# Patient Record
Sex: FEMALE | Race: WHITE | NOT HISPANIC OR LATINO | Employment: FULL TIME | URBAN - METROPOLITAN AREA
[De-identification: names, ages, dates, MRNs, and addresses within clinical notes are randomized per-mention and may not be internally consistent; named-entity substitution may affect disease eponyms.]

---

## 2021-05-30 ENCOUNTER — HOSPITAL ENCOUNTER (EMERGENCY)
Facility: CLINIC | Age: 51
Discharge: HOME | End: 2021-05-30
Attending: EMERGENCY MEDICINE | Admitting: EMERGENCY MEDICINE
Payer: COMMERCIAL

## 2021-05-30 VITALS
HEART RATE: 70 BPM | TEMPERATURE: 98.6 F | DIASTOLIC BLOOD PRESSURE: 75 MMHG | RESPIRATION RATE: 18 BRPM | SYSTOLIC BLOOD PRESSURE: 122 MMHG | OXYGEN SATURATION: 96 %

## 2021-05-30 DIAGNOSIS — G43.819 OTHER MIGRAINE WITHOUT STATUS MIGRAINOSUS, INTRACTABLE: ICD-10-CM

## 2021-05-30 PROCEDURE — 258N000003 HC RX IP 258 OP 636: Performed by: EMERGENCY MEDICINE

## 2021-05-30 PROCEDURE — 96375 TX/PRO/DX INJ NEW DRUG ADDON: CPT

## 2021-05-30 PROCEDURE — 96372 THER/PROPH/DIAG INJ SC/IM: CPT | Mod: 59 | Performed by: EMERGENCY MEDICINE

## 2021-05-30 PROCEDURE — 96365 THER/PROPH/DIAG IV INF INIT: CPT

## 2021-05-30 PROCEDURE — 99285 EMERGENCY DEPT VISIT HI MDM: CPT | Mod: 25

## 2021-05-30 PROCEDURE — 96361 HYDRATE IV INFUSION ADD-ON: CPT

## 2021-05-30 PROCEDURE — 250N000011 HC RX IP 250 OP 636: Performed by: EMERGENCY MEDICINE

## 2021-05-30 RX ORDER — KETOROLAC TROMETHAMINE 15 MG/ML
15 INJECTION, SOLUTION INTRAMUSCULAR; INTRAVENOUS ONCE
Status: COMPLETED | OUTPATIENT
Start: 2021-05-30 | End: 2021-05-30

## 2021-05-30 RX ORDER — OLANZAPINE 10 MG/2ML
5 INJECTION, POWDER, FOR SOLUTION INTRAMUSCULAR ONCE
Status: COMPLETED | OUTPATIENT
Start: 2021-05-30 | End: 2021-05-30

## 2021-05-30 RX ORDER — SODIUM CHLORIDE 9 MG/ML
INJECTION, SOLUTION INTRAVENOUS
Status: DISCONTINUED
Start: 2021-05-30 | End: 2021-05-30 | Stop reason: HOSPADM

## 2021-05-30 RX ORDER — DEXAMETHASONE SODIUM PHOSPHATE 10 MG/ML
10 INJECTION, SOLUTION INTRAMUSCULAR; INTRAVENOUS ONCE
Status: COMPLETED | OUTPATIENT
Start: 2021-05-30 | End: 2021-05-30

## 2021-05-30 RX ORDER — METOCLOPRAMIDE HYDROCHLORIDE 5 MG/ML
10 INJECTION INTRAMUSCULAR; INTRAVENOUS ONCE
Status: COMPLETED | OUTPATIENT
Start: 2021-05-30 | End: 2021-05-30

## 2021-05-30 RX ORDER — MAGNESIUM SULFATE HEPTAHYDRATE 40 MG/ML
2 INJECTION, SOLUTION INTRAVENOUS ONCE
Status: COMPLETED | OUTPATIENT
Start: 2021-05-30 | End: 2021-05-30

## 2021-05-30 RX ORDER — DIPHENHYDRAMINE HYDROCHLORIDE 50 MG/ML
25 INJECTION INTRAMUSCULAR; INTRAVENOUS ONCE
Status: COMPLETED | OUTPATIENT
Start: 2021-05-30 | End: 2021-05-30

## 2021-05-30 RX ADMIN — KETOROLAC TROMETHAMINE 15 MG: 15 INJECTION, SOLUTION INTRAMUSCULAR; INTRAVENOUS at 09:30

## 2021-05-30 RX ADMIN — OLANZAPINE 5 MG: 10 INJECTION, POWDER, LYOPHILIZED, FOR SOLUTION INTRAMUSCULAR at 10:26

## 2021-05-30 RX ADMIN — DEXAMETHASONE SODIUM PHOSPHATE 10 MG: 10 INJECTION, SOLUTION INTRAMUSCULAR; INTRAVENOUS at 10:30

## 2021-05-30 RX ADMIN — MAGNESIUM SULFATE HEPTAHYDRATE 2 G: 2 INJECTION, SOLUTION INTRAVENOUS at 10:50

## 2021-05-30 RX ADMIN — METOCLOPRAMIDE HYDROCHLORIDE 10 MG: 5 INJECTION INTRAMUSCULAR; INTRAVENOUS at 09:31

## 2021-05-30 RX ADMIN — DIPHENHYDRAMINE HYDROCHLORIDE 25 MG: 50 INJECTION INTRAMUSCULAR; INTRAVENOUS at 09:28

## 2021-05-30 RX ADMIN — SODIUM CHLORIDE 1000 ML: 9 INJECTION, SOLUTION INTRAVENOUS at 09:26

## 2021-05-30 ASSESSMENT — ENCOUNTER SYMPTOMS
VOMITING: 1
WEAKNESS: 0
ABDOMINAL PAIN: 0
NUMBNESS: 0
COUGH: 0
DIZZINESS: 0
LIGHT-HEADEDNESS: 0
SPEECH DIFFICULTY: 0
FEVER: 0
SHORTNESS OF BREATH: 0
NAUSEA: 1
CHILLS: 0
HEADACHES: 1

## 2021-05-30 NOTE — DISCHARGE INSTRUCTIONS
-Take acetaminophen 500 to 1000 mg by mouth every 4 to 6 hours as needed for pain or fever.  Do not take more than 4000 mg in 24 hours.  Do not take within 6 hours of another acetaminophen containing medication such as norco (vicodin) or percocet.  - Take ibuprofen 600 to 800 mg by mouth every 6 to 8 hours as needed for pain or fever    Please return to the emergency department as needed for new or worsening symptoms including severe and uncontrollable pain, severe confusion, seizures, fainting, vomiting unable to keep anything down, any other concerning symptoms.          Discharge Instructions  Migraine    You were seen today for a headache that your provider thinks is likely a migraine. At this time your provider does not find that your headache is a sign of anything dangerous or life-threatening.  However, sometimes the signs of serious illness do not show up right away.      Generally, every Emergency Department visit should have a follow-up clinic visit with either a primary or a specialty clinic/provider. Please follow-up as instructed by your emergency provider today.    Return to the Emergency Department if:  You get a fever of 100.4 F or higher.  You get a stiff neck with your headache.  You get a new headache that is different or worse than headaches you have had before.  You are vomiting (throwing up) and cannot keep food or water down.  You have blurry or double vision or other problems with your eyes.  You have a new weakness on one side of your body.  You have difficulty with balance which is new.  You or your family thinks you are confused.  You have a seizure.    Treatment:  Often, treatment for your migraine will take some time to make you headache stop.  Going home to sleep can be very effective.  Use your medications as directed; overuse of medications can actually cause headaches.  Once your headache has gone away, avoid triggers such as certain foods, skipping meals, bright lights, changes in  sleep, exercise and stress.  Migraine headaches can have symptoms before the pain starts, like vision changes, funny smells/tastes, dizziness or other symptoms.  Treating a headache as soon as the first symptoms come on is very important and gives the best chance of stopping the headache.  If headaches are severe or frequent, you may need to start daily medication to prevent the headaches.  Carbon monoxide can cause headaches, so not burning things in your home is important.  Also get a carbon monoxide detector.  Some medications for migraines may raise your blood pressure, so use with caution if you have high blood pressure or heart problems.  If you were given a prescription for medicine here today, be sure to read all of the information (including the package insert) that comes with your prescription.  This will include important information about the medicine, its side effects, and any warnings that you need to know about.  The pharmacist who fills the prescription can provide more information and answer questions you may have about the medicine.  If you have questions or concerns that the pharmacist cannot address, please call or return to the Emergency Department.   Remember that you can always come back to the Emergency Department if you are not able to see your regular provider in the amount of time listed above, if you get any new symptoms, or if there is anything that worries you.

## 2021-05-30 NOTE — ED TRIAGE NOTES
Pt to ER with c/o migraine typical for her n/v  
Airway patent, Nasal mucosa clear. Mouth with DRY mucosa. Throat has no vesicles, no oropharyngeal exudates and uvula is midline.

## 2021-05-30 NOTE — ED PROVIDER NOTES
History   Chief Complaint:  Headache     The history is provided by the patient.      Camilla Napier is a 51 year old female with history of migraines who presents with headcahe. The patient reports that she had onset of a migraine around 0100 this morning and had tried taking her Imitrex and Zofran but continued to have bouts of nausea and emesis every 15 minutes. She reports that this feels like her prior migraines and that she had a throbbing behind her eyes. She rates her pain at a 7-8/10. She denies hematemesis, one sided weakness, fever, cough, shortness of breath, abdominal pain, or recent falls or trauma to the head. She denies alcohol, tobacco use or street drugs.     Review of Systems   Constitutional: Negative for chills and fever.   Respiratory: Negative for cough and shortness of breath.    Cardiovascular: Negative for chest pain.   Gastrointestinal: Positive for nausea and vomiting. Negative for abdominal pain.   Neurological: Positive for headaches. Negative for dizziness, speech difficulty, weakness, light-headedness and numbness.   All other systems reviewed and are negative.      Allergies:  Codeine  Compazine [Prochlorperazine]  Haloperidol  Metoclopramide     Medications:  Imitrex    Zofran     Past Medical History:    Osteopenia   Migraines     Past Surgical History:    Arthroscopy knee left, x3   Breast biopsy     Family History:    Breast cancer  Asthma   Scoliosis   Heart disease   Colon polyps     Social History:  Smoking status: no  Alcohol use: no  Drug use: no  Presents to the ED with     Physical Exam     Patient Vitals for the past 24 hrs:   BP Temp Temp src Pulse Resp SpO2   05/30/21 1040 125/73 -- -- 59 -- 94 %   05/30/21 1030 -- -- -- -- -- 100 %   05/30/21 1020 (!) 133/90 -- -- 74 -- 95 %   05/30/21 1010 -- -- -- -- -- 94 %   05/30/21 1000 (!) 119/93 -- -- 63 -- 95 %   05/30/21 0652 136/78 98.6  F (37  C) Temporal 66 18 99 %       Physical Exam  Constitutional: Well  developed, nontox appearance  Head: Atraumatic.   Neck:  no stridor  Eyes: no scleral icterus, PERRL, EOMI  Cardiovascular: RRR, 2+ bilat radial pulses  Pulmonary/Chest: nml resp effort  Ext: Warm, well perfused, no edema  Neurological: A&O,  CNII-XII intact, 5/5 strength throughout upper and lower ext, symmetric; sensation grossly intact  Skin: Skin is warm and dry.   Psychiatric: Behavior is normal. Thought content normal.   Nursing note and vitals reviewed.    Emergency Department Course     Emergency Department Course:  Reviewed:  I reviewed the patient's nursing notes, vitals, past medical records, Care Everywhere.     Assessments:  0849 I performed an assessment and examination of the patient as noted above.      1143 The patient reports that she is feeling better. Findings and plan explained to the Patient. Patient discharged home with instructions regarding supportive care, medications, and reasons to return. The importance of close follow-up was reviewed.     Interventions:  0926 NS 1L IV Bolus   0928 Benadryl 25 mg, IV  0930 Toradol, 15 mg, IV   0931 Metoclopramide 10 mg, IV   1026 Zyprexa, 5 mg, IM   1030 Dexamethasone 10 mg, IV   1050 Magnesium sulfate, 2 g, IV    Disposition:  The patient was discharged to home.       Impression & Plan   Medical Decision Making:  Camilla Napier is a 51 year old female presenting w/ headache     Pt's presentation is consistent with their typical migraine/headache.  Doubt intracranial hemorrhage, intracranial mass, CNS infection such as meningitis or encephalitis,  acute vascular catastrophe.  Given the significantly low likelihood that the patient's headache is coming from a dangerous etiology, imaging deferred.  Medications given as noted above with improvement in symptoms.  Pt requested discharge prior to full resolution of symptoms.  At this time I feel the patient is safe for discharge.  Recommendations given regarding follow up with PCP, Neurology in home town  and return to the emergency department as needed for new or worsening symptoms.  Pt counseled on diagnosis and disposition.  They are understanding and agreeable to plan. Patient discharged in stable condition.       Covid-19  Camilla Napier was evaluated during a global COVID-19 pandemic, which necessitated consideration that the patient might be at risk for infection with the SARS-CoV-2 virus that causes COVID-19.   Applicable protocols for evaluation were followed during the patient's care.     Diagnosis:    ICD-10-CM    1. Other migraine without status migrainosus, intractable  G43.819        Discharge Medications:  New Prescriptions    No medications on file       Scribe Disclosure:  Elisa RING, am serving as a scribe at 8:49 AM on 5/30/2021 to document services personally performed by Josue Leiva MD based on my observations and the provider's statements to me.        Josue Leiva MD  05/30/21 [id number]